# Patient Record
Sex: FEMALE | URBAN - METROPOLITAN AREA
[De-identification: names, ages, dates, MRNs, and addresses within clinical notes are randomized per-mention and may not be internally consistent; named-entity substitution may affect disease eponyms.]

---

## 2024-01-18 ENCOUNTER — OFFICE VISIT (OUTPATIENT)
Dept: OBGYN CLINIC | Age: 29
End: 2024-01-18
Payer: COMMERCIAL

## 2024-01-18 ENCOUNTER — HOSPITAL ENCOUNTER (OUTPATIENT)
Age: 29
Setting detail: SPECIMEN
Discharge: HOME | End: 2024-01-18

## 2024-01-18 VITALS
SYSTOLIC BLOOD PRESSURE: 114 MMHG | HEIGHT: 61 IN | DIASTOLIC BLOOD PRESSURE: 76 MMHG | BODY MASS INDEX: 44.84 KG/M2 | WEIGHT: 237.5 LBS

## 2024-01-18 DIAGNOSIS — Z01.419 WELL WOMAN EXAM WITH ROUTINE GYNECOLOGICAL EXAM: Primary | ICD-10-CM

## 2024-01-18 DIAGNOSIS — Z12.4 CERVICAL CANCER SCREENING: ICD-10-CM

## 2024-01-18 PROCEDURE — 99385 PREV VISIT NEW AGE 18-39: CPT | Performed by: ADVANCED PRACTICE MIDWIFE

## 2024-01-18 RX ORDER — NORGESTIMATE AND ETHINYL ESTRADIOL 7DAYSX3 LO
1 KIT ORAL EVERY MORNING
COMMUNITY

## 2024-01-18 RX ORDER — COLESEVELAM 180 1/1
TABLET ORAL
COMMUNITY

## 2024-01-18 SDOH — ECONOMIC STABILITY: FOOD INSECURITY: WITHIN THE PAST 12 MONTHS, THE FOOD YOU BOUGHT JUST DIDN'T LAST AND YOU DIDN'T HAVE MONEY TO GET MORE.: NEVER TRUE

## 2024-01-18 SDOH — ECONOMIC STABILITY: TRANSPORTATION INSECURITY
IN THE PAST 12 MONTHS, HAS THE LACK OF TRANSPORTATION KEPT YOU FROM MEDICAL APPOINTMENTS OR FROM GETTING MEDICATIONS?: NO

## 2024-01-18 SDOH — ECONOMIC STABILITY: TRANSPORTATION INSECURITY
IN THE PAST 12 MONTHS, HAS LACK OF TRANSPORTATION KEPT YOU FROM MEETINGS, WORK, OR FROM GETTING THINGS NEEDED FOR DAILY LIVING?: NO

## 2024-01-18 SDOH — ECONOMIC STABILITY: FOOD INSECURITY: WITHIN THE PAST 12 MONTHS, YOU WORRIED THAT YOUR FOOD WOULD RUN OUT BEFORE YOU GOT MONEY TO BUY MORE.: NEVER TRUE

## 2024-01-18 ASSESSMENT — SOCIAL DETERMINANTS OF HEALTH (SDOH)
WITHIN THE LAST YEAR, HAVE TO BEEN RAPED OR FORCED TO HAVE ANY KIND OF SEXUAL ACTIVITY BY YOUR PARTNER OR EX-PARTNER?: NO
WITHIN THE LAST YEAR, HAVE YOU BEEN AFRAID OF YOUR PARTNER OR EX-PARTNER?: NO
WITHIN THE LAST YEAR, HAVE YOU BEEN KICKED, HIT, SLAPPED, OR OTHERWISE PHYSICALLY HURT BY YOUR PARTNER OR EX-PARTNER?: NO
HOW HARD IS IT FOR YOU TO PAY FOR THE VERY BASICS LIKE FOOD, HOUSING, MEDICAL CARE, AND HEATING?: NOT HARD AT ALL
WITHIN THE LAST YEAR, HAVE YOU BEEN HUMILIATED OR EMOTIONALLY ABUSED IN OTHER WAYS BY YOUR PARTNER OR EX-PARTNER?: NO

## 2024-01-18 ASSESSMENT — PATIENT HEALTH QUESTIONNAIRE - PHQ9
1. LITTLE INTEREST OR PLEASURE IN DOING THINGS: 0
SUM OF ALL RESPONSES TO PHQ QUESTIONS 1-9: 0
2. FEELING DOWN, DEPRESSED OR HOPELESS: 0
SUM OF ALL RESPONSES TO PHQ QUESTIONS 1-9: 0
SUM OF ALL RESPONSES TO PHQ QUESTIONS 1-9: 0
SUM OF ALL RESPONSES TO PHQ9 QUESTIONS 1 & 2: 0
SUM OF ALL RESPONSES TO PHQ QUESTIONS 1-9: 0

## 2024-01-18 ASSESSMENT — ANXIETY QUESTIONNAIRES
3. WORRYING TOO MUCH ABOUT DIFFERENT THINGS: 0
2. NOT BEING ABLE TO STOP OR CONTROL WORRYING: 0
7. FEELING AFRAID AS IF SOMETHING AWFUL MIGHT HAPPEN: 0
6. BECOMING EASILY ANNOYED OR IRRITABLE: 0
5. BEING SO RESTLESS THAT IT IS HARD TO SIT STILL: 0
1. FEELING NERVOUS, ANXIOUS, OR ON EDGE: 0
4. TROUBLE RELAXING: 0
GAD7 TOTAL SCORE: 0

## 2024-01-18 NOTE — PROGRESS NOTES
Patient is here for her annual exam  Last pap was 8/21/2021 wnl    Patient would like a refill of her    Chaperone for Intimate Exam  Chaperone was offered as part of the rooming process. Patient declined and agrees to continue with exam without a chaperone.  Chaperone: n/a       GAD7-0  PHQ2- 0

## 2024-01-18 NOTE — PROGRESS NOTES
[facility] OBSTETRICS AND GYNECOLOGY  [address]   [address]  [facility] [address]  Dept: [phone]  Annual Exam    Patient Name: Brianda David  Patient : 1995  MRN #: [id number]  [facility] #: [id number]    Date: 2024             Primary Care Physician: Unknown, Provider, DO     HPI : Brianda David is a 28 y.o. female  here for an annual exam.    She reports her menses are every 28 days days, they last 4 days. The flow is light and they are not painful. On ocps   Hx of irregular menses   She is sexually active and uses OCPS for contraception. She is happy with this method. She denies to pain with intercourse.    She has sex with males. She has had 1 sexual partners in the last 12 months. She does not uses condoms.    She denies to bladder issues.     Chaperone for Intimate Exam  Chaperone was offered as part of the rooming process. Patient declined and agrees to continue with exam without a chaperone.    Preventive Health Screening:   Date of last pap:  normal    ________________________________________________________________________  OB History    Para Term  AB Living   0 0 0 0 0 0   SAB IAB Ectopic Molar Multiple Live Births   0 0 0 0 0 0     Past Medical History:   Diagnosis Date    Acne     Irregular menses     Seasonal affective disorder (HCC)                                                                    Past Surgical History:   Procedure Laterality Date    CHOLECYSTECTOMY  2020    EYE SURGERY Right     WISDOM TOOTH EXTRACTION       Family History   Problem Relation Age of Onset    Hypertension Father     Hypertension Mother     Cancer Mother     Cancer Maternal Aunt      Social History     Socioeconomic History    Marital status: Unknown     Spouse name: Not on file    Number of children: Not on file    Years of education: Not on file    Highest education level: Not on file   Occupational

## 2024-02-08 LAB — CYTOLOGY REPORT: NORMAL

## 2024-03-11 SDOH — HEALTH STABILITY: PHYSICAL HEALTH: ON AVERAGE, HOW MANY DAYS PER WEEK DO YOU ENGAGE IN MODERATE TO STRENUOUS EXERCISE (LIKE A BRISK WALK)?: 3 DAYS

## 2024-03-11 SDOH — HEALTH STABILITY: PHYSICAL HEALTH: ON AVERAGE, HOW MANY MINUTES DO YOU ENGAGE IN EXERCISE AT THIS LEVEL?: 30 MIN

## 2024-03-12 ENCOUNTER — OFFICE VISIT (OUTPATIENT)
Dept: PRIMARY CARE CLINIC | Age: 29
End: 2024-03-12
Payer: COMMERCIAL

## 2024-03-12 VITALS
RESPIRATION RATE: 16 BRPM | BODY MASS INDEX: 45.01 KG/M2 | DIASTOLIC BLOOD PRESSURE: 84 MMHG | HEART RATE: 82 BPM | OXYGEN SATURATION: 99 % | SYSTOLIC BLOOD PRESSURE: 124 MMHG | WEIGHT: 238.2 LBS

## 2024-03-12 DIAGNOSIS — E88.819 INSULIN RESISTANCE: ICD-10-CM

## 2024-03-12 DIAGNOSIS — Z76.89 ENCOUNTER TO ESTABLISH CARE: Primary | ICD-10-CM

## 2024-03-12 DIAGNOSIS — E24.0 CUSHING'S DISEASE (HCC): ICD-10-CM

## 2024-03-12 DIAGNOSIS — E78.1 HYPERTRIGLYCERIDEMIA: ICD-10-CM

## 2024-03-12 DIAGNOSIS — R10.13 DYSPEPSIA: ICD-10-CM

## 2024-03-12 PROCEDURE — 99204 OFFICE O/P NEW MOD 45 MIN: CPT | Performed by: NURSE PRACTITIONER

## 2024-03-12 RX ORDER — OMEPRAZOLE 20 MG/1
20 CAPSULE, DELAYED RELEASE ORAL
Qty: 90 CAPSULE | Refills: 1 | Status: SHIPPED | OUTPATIENT
Start: 2024-03-12

## 2024-03-12 RX ORDER — COLESEVELAM 180 1/1
TABLET ORAL
Qty: 180 TABLET | Refills: 3
Start: 2024-03-12

## 2024-03-12 SDOH — ECONOMIC STABILITY: INCOME INSECURITY: HOW HARD IS IT FOR YOU TO PAY FOR THE VERY BASICS LIKE FOOD, HOUSING, MEDICAL CARE, AND HEATING?: NOT HARD AT ALL

## 2024-03-12 SDOH — ECONOMIC STABILITY: FOOD INSECURITY: WITHIN THE PAST 12 MONTHS, THE FOOD YOU BOUGHT JUST DIDN'T LAST AND YOU DIDN'T HAVE MONEY TO GET MORE.: NEVER TRUE

## 2024-03-12 SDOH — ECONOMIC STABILITY: FOOD INSECURITY: WITHIN THE PAST 12 MONTHS, YOU WORRIED THAT YOUR FOOD WOULD RUN OUT BEFORE YOU GOT MONEY TO BUY MORE.: NEVER TRUE

## 2024-03-12 SDOH — ECONOMIC STABILITY: HOUSING INSECURITY
IN THE LAST 12 MONTHS, WAS THERE A TIME WHEN YOU DID NOT HAVE A STEADY PLACE TO SLEEP OR SLEPT IN A SHELTER (INCLUDING NOW)?: NO

## 2024-03-12 ASSESSMENT — PATIENT HEALTH QUESTIONNAIRE - PHQ9
SUM OF ALL RESPONSES TO PHQ QUESTIONS 1-9: 0
SUM OF ALL RESPONSES TO PHQ QUESTIONS 1-9: 0
1. LITTLE INTEREST OR PLEASURE IN DOING THINGS: 0
SUM OF ALL RESPONSES TO PHQ QUESTIONS 1-9: 0
SUM OF ALL RESPONSES TO PHQ QUESTIONS 1-9: 0
SUM OF ALL RESPONSES TO PHQ9 QUESTIONS 1 & 2: 0
2. FEELING DOWN, DEPRESSED OR HOPELESS: 0

## 2024-03-12 NOTE — PROGRESS NOTES
[facility]  [facility] PRIMARY [facility]ARE  [address] [place] [address] [address]  [address]  [facility] [address]  Dept: [phone]  Dept Fax: [phone]    Brianda David is a 29 y.o. female who presents today for her medical conditions/complaintsas noted below.  Brianda David is c/o of Establish Care (Discuss seeing endo for PCOS, did discuss w/ OB)        HPI:     Pt presents to establish care  Bp stable  Weight stable    Pt presents to establish care. Previous providers were at [facility].   Works at [employer]     Issues with dairy as a child. May have IBS. Irregular bowel movements worse after gallbladder. Greasy, fatty or diary she tries to avoid. Welchol does help. She only takes it once a day.   Gallbladder was removed 3.5 years ago. Prior to her gallbladder, she was belching a lot. She has been more consistent the last few months and it has seemed to help. Bad heart burn if she eats to fast and greasy/fatty     Recent new gyn    She has been overweight most her life. Its hard for her to lose weight. She eats healthy. Recently started exercising. Unsure if she has pcos or insulin resistance. C/o buffalo hump on neck. Possible referral to endo         Past Medical History:   Diagnosis Date    Acne     GERD (gastroesophageal reflux disease)     High serum bile acid     Hyperlipidemia     Irregular menses     Seasonal affective disorder (HCC)       Past Surgical History:   Procedure Laterality Date    CHOLECYSTECTOMY  2020    EYE SURGERY Right     WISDOM TOOTH EXTRACTION         Family History   Problem Relation Age of Onset    Hypertension Father     Depression Father     High Blood Pressure Father     Other Father         DVT/Pulmonary Embolism.    Hypertension Mother     Cancer Mother     Breast Cancer Mother         DCIS    High Blood Pressure Mother     Miscarriages / Stillbirths Mother     Cancer Maternal Aunt     Stroke Maternal Grandfather     Breast Cancer Paternal Aunt

## 2024-03-13 ASSESSMENT — ENCOUNTER SYMPTOMS
EYE ITCHING: 0
SINUS PAIN: 0
VOMITING: 0
WHEEZING: 0
NAUSEA: 0
SORE THROAT: 0
SINUS PRESSURE: 0
CHEST TIGHTNESS: 0
SHORTNESS OF BREATH: 0
EYE REDNESS: 0
COUGH: 0
TROUBLE SWALLOWING: 0
EYE DISCHARGE: 0
DIARRHEA: 0
ABDOMINAL PAIN: 0

## 2024-06-05 ENCOUNTER — HOSPITAL ENCOUNTER (OUTPATIENT)
Age: 29
Setting detail: SPECIMEN
Discharge: HOME | End: 2024-06-05

## 2024-06-05 DIAGNOSIS — R53.82 CHRONIC FATIGUE: ICD-10-CM

## 2024-06-05 DIAGNOSIS — Z13.29 ENCOUNTER FOR SCREENING FOR OTHER SUSPECTED ENDOCRINE DISORDER: Primary | ICD-10-CM

## 2024-06-05 LAB
25(OH)D3 SERPL-MCNC: 27.4 NG/ML (ref 30–100)
ALBUMIN SERPL-MCNC: 4.5 G/DL (ref 3.5–5.2)
ALBUMIN/GLOB SERPL: 2 {RATIO} (ref 1–2.5)
ALP SERPL-CCNC: 102 U/L (ref 35–104)
ALT SERPL-CCNC: 26 U/L (ref 10–35)
ANION GAP SERPL CALCULATED.3IONS-SCNC: 10 MMOL/L (ref 9–16)
AST SERPL-CCNC: 28 U/L (ref 10–35)
BILIRUB SERPL-MCNC: 0.3 MG/DL (ref 0–1.2)
BUN SERPL-MCNC: 13 MG/DL (ref 6–20)
CALCIUM SERPL-MCNC: 9.6 MG/DL (ref 8.6–10.4)
CHLORIDE SERPL-SCNC: 104 MMOL/L (ref 98–107)
CO2 SERPL-SCNC: 26 MMOL/L (ref 20–31)
CREAT SERPL-MCNC: 0.6 MG/DL (ref 0.5–0.9)
EST. AVERAGE GLUCOSE BLD GHB EST-MCNC: 111 MG/DL
ESTRADIOL LEVEL: 16 PG/ML
FSH SERPL-ACNC: 4.6 MIU/ML
GFR, ESTIMATED: >90 ML/MIN/1.73M2
GLUCOSE P FAST SERPL-MCNC: 92 MG/DL (ref 74–99)
HBA1C MFR BLD: 5.5 % (ref 4–6)
INSULIN COMMENT: NORMAL
INSULIN REFERENCE RANGE:: NORMAL
INSULIN: 19.5 MU/L
IRON SATN MFR SERPL: 26 % (ref 20–55)
IRON SERPL-MCNC: 112 UG/DL (ref 37–145)
LH SERPL-ACNC: 7.7 MIU/ML (ref 1.7–8.6)
POTASSIUM SERPL-SCNC: 4.8 MMOL/L (ref 3.7–5.3)
PROLACTIN SERPL-MCNC: 12.1 NG/ML (ref 4.79–23.3)
PROT SERPL-MCNC: 7.4 G/DL (ref 6.6–8.7)
SODIUM SERPL-SCNC: 140 MMOL/L (ref 136–145)
T3FREE SERPL-MCNC: 3 PG/ML (ref 2–4.4)
T4 FREE SERPL-MCNC: 0.9 NG/DL (ref 0.92–1.68)
TIBC SERPL-MCNC: 432 UG/DL (ref 250–450)
TSH SERPL DL<=0.05 MIU/L-ACNC: 3.43 UIU/ML (ref 0.27–4.2)
UNSATURATED IRON BINDING CAPACITY: 320 UG/DL (ref 112–347)

## 2024-06-05 RX ORDER — SODIUM CHLORIDE 9 MG/ML
5-250 INJECTION, SOLUTION INTRAVENOUS PRN
OUTPATIENT
Start: 2024-06-05

## 2024-06-06 ENCOUNTER — TELEPHONE (OUTPATIENT)
Dept: INFUSION THERAPY | Facility: MEDICAL CENTER | Age: 29
End: 2024-06-06

## 2024-06-06 ENCOUNTER — TELEPHONE (OUTPATIENT)
Dept: ONCOLOGY | Age: 29
End: 2024-06-06

## 2024-06-06 ENCOUNTER — PATIENT MESSAGE (OUTPATIENT)
Dept: PRIMARY CARE CLINIC | Age: 29
End: 2024-06-06

## 2024-06-06 DIAGNOSIS — K58.2 IRRITABLE BOWEL SYNDROME WITH BOTH CONSTIPATION AND DIARRHEA: Primary | ICD-10-CM

## 2024-06-06 NOTE — TELEPHONE ENCOUNTER
6/5/24 Dr. Gopi Adler MD  Serum cortisol 30 min after 1mcg Cotrosyn IV  Order noted and to  for processing

## 2024-06-06 NOTE — TELEPHONE ENCOUNTER
I TRIED TO CALL [facility] TO SCHEDULE HER CORTROSYN TEST AND HAD TO LEAVE A MESSAGE TO CALL THE OFFICE TO SCHEDULE.

## 2024-06-07 ENCOUNTER — HOSPITAL ENCOUNTER (OUTPATIENT)
Dept: ULTRASOUND IMAGING | Age: 29
End: 2024-06-07
Attending: INTERNAL MEDICINE
Payer: COMMERCIAL

## 2024-06-07 ENCOUNTER — HOSPITAL ENCOUNTER (OUTPATIENT)
Age: 29
Discharge: HOME | End: 2024-06-07
Payer: COMMERCIAL

## 2024-06-07 DIAGNOSIS — R63.5 ABNORMAL WEIGHT GAIN: ICD-10-CM

## 2024-06-07 DIAGNOSIS — Z13.29 ENCOUNTER FOR SCREENING FOR OTHER SUSPECTED ENDOCRINE DISORDER: ICD-10-CM

## 2024-06-07 LAB — THYROPEROXIDASE AB SERPL IA-ACNC: <4 IU/ML (ref 0–25)

## 2024-06-07 PROCEDURE — 76536 US EXAM OF HEAD AND NECK: CPT

## 2024-06-16 ENCOUNTER — HOSPITAL ENCOUNTER (OUTPATIENT)
Age: 29
Discharge: HOME | End: 2024-06-16
Payer: COMMERCIAL

## 2024-06-16 DIAGNOSIS — K58.2 IRRITABLE BOWEL SYNDROME WITH BOTH CONSTIPATION AND DIARRHEA: ICD-10-CM

## 2024-06-16 PROCEDURE — 83516 IMMUNOASSAY NONANTIBODY: CPT

## 2024-06-16 PROCEDURE — 82785 ASSAY OF IGE: CPT

## 2024-06-16 PROCEDURE — 86003 ALLG SPEC IGE CRUDE XTRC EA: CPT

## 2024-06-16 PROCEDURE — 82784 ASSAY IGA/IGD/IGG/IGM EACH: CPT

## 2024-06-16 PROCEDURE — 36415 COLL VENOUS BLD VENIPUNCTURE: CPT

## 2024-06-17 ENCOUNTER — HOSPITAL ENCOUNTER (OUTPATIENT)
Facility: MEDICAL CENTER | Age: 29
End: 2024-06-17
Payer: COMMERCIAL

## 2024-06-17 LAB
GLIADIN IGA SER IA-ACNC: <0.1 U/ML
GLIADIN IGG SER IA-ACNC: <0.4 U/ML
IGA SERPL-MCNC: 144 MG/DL (ref 70–400)
TTG IGA SER IA-ACNC: <0.1 U/ML

## 2024-06-20 ENCOUNTER — HOSPITAL ENCOUNTER (OUTPATIENT)
Dept: INFUSION THERAPY | Facility: MEDICAL CENTER | Age: 29
Discharge: HOME | End: 2024-06-20
Payer: COMMERCIAL

## 2024-06-20 VITALS
SYSTOLIC BLOOD PRESSURE: 126 MMHG | RESPIRATION RATE: 16 BRPM | HEART RATE: 68 BPM | DIASTOLIC BLOOD PRESSURE: 83 MMHG | TEMPERATURE: 98.6 F

## 2024-06-20 DIAGNOSIS — Z13.29 ENCOUNTER FOR SCREENING FOR OTHER SUSPECTED ENDOCRINE DISORDER: ICD-10-CM

## 2024-06-20 DIAGNOSIS — R53.82 CHRONIC FATIGUE: Primary | ICD-10-CM

## 2024-06-20 LAB
BARLEY IGE QN: <0.1 KU/L (ref 0–0.34)
BEEF IGE QN: <0.1 KU/L (ref 0–0.34)
CABBAGE IGE QN: <0.1 KU/L (ref 0–0.34)
CARROT IGE QN: <0.1 KU/L (ref 0–0.34)
CHICKEN MEAT IGE QN: <0.1 KU/L (ref 0–0.34)
CODFISH IGE QN: <0.1 KU/L (ref 0–0.34)
CORN IGE QN: <0.1 KU/L (ref 0–0.34)
CORTIS SERPL-MCNC: 31.7 UG/DL (ref 2.5–19.5)
COW MILK IGE QN: <0.1 KU/L (ref 0–0.34)
CRAB IGE QN: <0.1 KU/L (ref 0–0.34)
EGG WHITE IGE QN: <0.1 KU/L (ref 0–0.34)
GRAPE IGE QN: <0.1 KU/L (ref 0–0.34)
IGE SERPL-ACNC: 31 IU/ML (ref 0–100)
LETTUCE IGE QN: <0.1 KU/L (ref 0–0.34)
OAT IGE QN: <0.1 KU/L (ref 0–0.34)
ORANGE TREE IGE QN: <0.1 KU/L (ref 0–0.34)
PAPRIKA IGE QN: <0.1 KU/L (ref 0–0.34)
PEANUT IGE QN: <0.1 KU/L (ref 0–0.34)
PORK IGE QN: <0.1 KU/L (ref 0–0.34)
POTATO IGE QN: <0.1 KU/L (ref 0–0.34)
RICE IGE QN: <0.1 KU/L (ref 0–0.34)
RYE IGE QN: <0.1 KU/L (ref 0–0.34)
SHRIMP IGE QN: <0.1 KU/L (ref 0–0.34)
SOYBEAN IGE QN: <0.1 KU/L (ref 0–0.34)
TOMATO IGE QN: <0.1 KU/L (ref 0–0.34)
TUNA IGE QN: <0.1 KU/L (ref 0–0.34)
WHEAT IGE QN: <0.1 KU/L (ref 0–0.34)
WHITE BEAN IGE QN: <0.1 KU/L (ref 0–0.34)

## 2024-06-20 PROCEDURE — 2580000003 HC RX 258: Performed by: INTERNAL MEDICINE

## 2024-06-20 PROCEDURE — 96374 THER/PROPH/DIAG INJ IV PUSH: CPT

## 2024-06-20 PROCEDURE — 82533 TOTAL CORTISOL: CPT

## 2024-06-20 PROCEDURE — 96375 TX/PRO/DX INJ NEW DRUG ADDON: CPT

## 2024-06-20 PROCEDURE — 6360000002 HC RX W HCPCS: Performed by: INTERNAL MEDICINE

## 2024-06-20 RX ORDER — SODIUM CHLORIDE 9 MG/ML
5-250 INJECTION, SOLUTION INTRAVENOUS PRN
OUTPATIENT
Start: 2024-06-20

## 2024-06-20 RX ORDER — SODIUM CHLORIDE 9 MG/ML
5-250 INJECTION, SOLUTION INTRAVENOUS PRN
Status: DISCONTINUED | OUTPATIENT
Start: 2024-06-20 | End: 2024-06-21 | Stop reason: HOSPADM

## 2024-06-20 RX ADMIN — SODIUM CHLORIDE 100 ML/HR: 9 INJECTION, SOLUTION INTRAVENOUS at 08:48

## 2024-06-20 RX ADMIN — COSYNTROPIN 1 MCG: 0.25 INJECTION, POWDER, LYOPHILIZED, FOR SOLUTION INTRAMUSCULAR; INTRAVENOUS at 08:50

## 2024-06-20 NOTE — PROGRESS NOTES
Pt here for Cortrosyn injection and lab.  Arrives ambulatory   Denies complaints/concerns.   Multiple attempts made for peripheral IV.  Cortrosyn given @ 0875  Phleb nancie lab @ 5775  Peripheral IV removed  Patient discharged in stable condition

## 2024-07-01 ENCOUNTER — HOSPITAL ENCOUNTER (OUTPATIENT)
Age: 29
Setting detail: SPECIMEN
Discharge: HOME | End: 2024-07-01

## 2024-07-05 LAB — CORTISOL SALIVARY: 0.04 UG/DL

## 2024-07-25 ENCOUNTER — CLINICAL DOCUMENTATION (OUTPATIENT)
Facility: HOSPITAL | Age: 29
End: 2024-07-25

## 2024-08-08 ENCOUNTER — HOSPITAL ENCOUNTER (OUTPATIENT)
Age: 29
Setting detail: SPECIMEN
Discharge: HOME | End: 2024-08-08

## 2024-08-08 LAB
25(OH)D3 SERPL-MCNC: 32.9 NG/ML (ref 30–100)
T3FREE SERPL-MCNC: 3.1 PG/ML (ref 2–4.4)
T4 FREE SERPL-MCNC: 1 NG/DL (ref 0.92–1.68)
TSH SERPL DL<=0.05 MIU/L-ACNC: 4.23 UIU/ML (ref 0.27–4.2)

## 2024-08-11 ENCOUNTER — PATIENT MESSAGE (OUTPATIENT)
Dept: OBGYN CLINIC | Age: 29
End: 2024-08-11

## 2024-08-12 RX ORDER — NORGESTIMATE AND ETHINYL ESTRADIOL 7DAYSX3 LO
1 KIT ORAL EVERY MORNING
Qty: 1 PACKET | Refills: 4 | Status: SHIPPED | OUTPATIENT
Start: 2024-08-12

## 2024-08-12 RX ORDER — NORGESTIMATE AND ETHINYL ESTRADIOL 7DAYSX3 LO
1 KIT ORAL EVERY MORNING
Qty: 1 PACKET | Refills: 4 | Status: SHIPPED | OUTPATIENT
Start: 2024-08-12 | End: 2024-08-12 | Stop reason: SDUPTHER

## 2024-08-13 RX ORDER — NORGESTIMATE AND ETHINYL ESTRADIOL 7DAYSX3 LO
1 KIT ORAL EVERY MORNING
Qty: 1 PACKET | Refills: 4 | OUTPATIENT
Start: 2024-08-13

## 2024-08-20 DIAGNOSIS — N94.6 DYSMENORRHEA: Primary | ICD-10-CM

## 2024-08-20 RX ORDER — NORGESTIMATE AND ETHINYL ESTRADIOL 7DAYSX3 LO
1 KIT ORAL EVERY MORNING
Qty: 1 PACKET | Refills: 4 | Status: SHIPPED | OUTPATIENT
Start: 2024-08-20

## 2024-09-12 ENCOUNTER — HOSPITAL ENCOUNTER (OUTPATIENT)
Age: 29
Setting detail: SPECIMEN
Discharge: HOME | End: 2024-09-12

## 2024-09-12 LAB
T4 FREE SERPL-MCNC: 1.1 NG/DL (ref 0.92–1.68)
TSH SERPL DL<=0.05 MIU/L-ACNC: 3.07 UIU/ML (ref 0.27–4.2)

## 2024-09-13 ENCOUNTER — PATIENT MESSAGE (OUTPATIENT)
Dept: PRIMARY CARE CLINIC | Age: 29
End: 2024-09-13

## 2024-09-13 DIAGNOSIS — Z13.220 ENCOUNTER FOR LIPID SCREENING FOR CARDIOVASCULAR DISEASE: Primary | ICD-10-CM

## 2024-09-13 DIAGNOSIS — Z13.6 ENCOUNTER FOR LIPID SCREENING FOR CARDIOVASCULAR DISEASE: Primary | ICD-10-CM

## 2024-09-20 ENCOUNTER — HOSPITAL ENCOUNTER (OUTPATIENT)
Age: 29
Discharge: HOME | End: 2024-09-20
Payer: COMMERCIAL

## 2024-09-20 DIAGNOSIS — Z13.220 ENCOUNTER FOR LIPID SCREENING FOR CARDIOVASCULAR DISEASE: ICD-10-CM

## 2024-09-20 DIAGNOSIS — Z13.6 ENCOUNTER FOR LIPID SCREENING FOR CARDIOVASCULAR DISEASE: ICD-10-CM

## 2024-09-20 LAB
CHOLEST SERPL-MCNC: 184 MG/DL (ref 0–199)
CHOLESTEROL/HDL RATIO: 5
GLUCOSE SERPL-MCNC: 94 MG/DL (ref 74–99)
HDLC SERPL-MCNC: 40 MG/DL
LDLC SERPL CALC-MCNC: 73 MG/DL (ref 0–100)
PATIENT FASTING?: YES
TRIGL SERPL-MCNC: 356 MG/DL
VLDLC SERPL CALC-MCNC: 71 MG/DL

## 2024-09-20 PROCEDURE — 80061 LIPID PANEL: CPT

## 2024-09-20 PROCEDURE — 36415 COLL VENOUS BLD VENIPUNCTURE: CPT

## 2024-09-20 PROCEDURE — 82947 ASSAY GLUCOSE BLOOD QUANT: CPT

## 2024-09-25 ENCOUNTER — LAB (OUTPATIENT)
Dept: FAMILY MEDICINE CLINIC | Age: 29
End: 2024-09-25

## 2024-09-25 VITALS — BODY MASS INDEX: 45.27 KG/M2 | WEIGHT: 239.8 LBS | HEIGHT: 61 IN

## 2024-11-19 ENCOUNTER — OFFICE VISIT (OUTPATIENT)
Dept: FAMILY MEDICINE CLINIC | Age: 29
End: 2024-11-19
Payer: COMMERCIAL

## 2024-11-19 VITALS
WEIGHT: 239 LBS | DIASTOLIC BLOOD PRESSURE: 72 MMHG | TEMPERATURE: 97.5 F | SYSTOLIC BLOOD PRESSURE: 116 MMHG | HEART RATE: 72 BPM | RESPIRATION RATE: 16 BRPM | BODY MASS INDEX: 45.16 KG/M2 | OXYGEN SATURATION: 98 %

## 2024-11-19 DIAGNOSIS — H69.93 DYSFUNCTION OF BOTH EUSTACHIAN TUBES: ICD-10-CM

## 2024-11-19 DIAGNOSIS — H60.501 ACUTE OTITIS EXTERNA OF RIGHT EAR, UNSPECIFIED TYPE: ICD-10-CM

## 2024-11-19 DIAGNOSIS — H65.191 ACUTE MEE (MIDDLE EAR EFFUSION), RIGHT: Primary | ICD-10-CM

## 2024-11-19 PROCEDURE — 99213 OFFICE O/P EST LOW 20 MIN: CPT | Performed by: NURSE PRACTITIONER

## 2024-11-19 RX ORDER — ERGOCALCIFEROL 1.25 MG/1
CAPSULE, LIQUID FILLED ORAL
COMMUNITY
Start: 2024-08-12

## 2024-11-19 RX ORDER — LEVOTHYROXINE SODIUM 25 UG/1
TABLET ORAL
COMMUNITY
Start: 2024-08-14

## 2024-11-19 RX ORDER — FLUTICASONE PROPIONATE 50 MCG
2 SPRAY, SUSPENSION (ML) NASAL DAILY
Qty: 16 G | Refills: 0 | Status: SHIPPED | OUTPATIENT
Start: 2024-11-19

## 2024-11-19 RX ORDER — CIPROFLOXACIN AND DEXAMETHASONE 3; 1 MG/ML; MG/ML
4 SUSPENSION/ DROPS AURICULAR (OTIC) 2 TIMES DAILY
Qty: 7.5 ML | Refills: 0 | Status: SHIPPED | OUTPATIENT
Start: 2024-11-19 | End: 2024-11-26

## 2024-11-19 ASSESSMENT — ENCOUNTER SYMPTOMS
NAUSEA: 0
EYE DISCHARGE: 0
FACIAL SWELLING: 0
VOMITING: 0
COUGH: 0
EYE REDNESS: 0

## 2024-11-19 NOTE — PROGRESS NOTES
[facility] W[facility]  [address]  [address]  [facility] [address]  Dept: [phone]  Dept Fax: [phone]    Brianda David is a 29 y.o. female who presents today for her medical conditions/complaints of   Chief Complaint   Patient presents with    Jaw Pain     R upper side x 2 days.     Ear Pain     R ear x 1 day          HPI:     /72   Pulse 72   Temp 97.5 °F (36.4 °C)   Resp 16   Wt 108.4 kg (239 lb)   SpO2 98%   BMI 45.16 kg/m²       HPI  Pt presented to the clinic today with c/o right ear pain. This is a new problem. The current episode started 2 days ago. Associated symptoms include: congestion, right ear canal swelling, muffled hearing .  Pertinent negatives include: No fever, chills, ear drainage, loss of hearing.     Past Medical History:   Diagnosis Date    Acne     GERD (gastroesophageal reflux disease)     High serum bile acid     Hyperlipidemia     Irregular menses     Seasonal affective disorder (HCC)         Past Surgical History:   Procedure Laterality Date    CHOLECYSTECTOMY  2020    EYE SURGERY Right     WISDOM TOOTH EXTRACTION         Family History   Problem Relation Age of Onset    Hypertension Father     Depression Father     High Blood Pressure Father     Other Father         DVT/Pulmonary Embolism.    Hypertension Mother     Cancer Mother     Breast Cancer Mother         DCIS    High Blood Pressure Mother     Miscarriages / Stillbirths Mother     Cancer Maternal Aunt     Stroke Maternal Grandfather     Breast Cancer Paternal Aunt         Negative genetic testing.    Breast Cancer Paternal Aunt     Depression Brother        Social History     Tobacco Use    Smoking status: Never    Smokeless tobacco: Never   Substance Use Topics    Alcohol use: Yes     Comment: Socially, rarely; 1-2 drinks per month        Prior to Visit Medications    Medication Sig Taking? Authorizing Provider   levothyroxine

## 2024-12-09 DIAGNOSIS — N94.6 DYSMENORRHEA: ICD-10-CM

## 2024-12-09 RX ORDER — NORGESTIMATE AND ETHINYL ESTRADIOL 7DAYSX3 LO
1 KIT ORAL EVERY MORNING
Qty: 28 TABLET | Refills: 1 | Status: SHIPPED | OUTPATIENT
Start: 2024-12-09

## 2024-12-09 NOTE — TELEPHONE ENCOUNTER
Brianda David is requesting a refill on [facility] .     Last Annual visit:  1/18/24 with Meenu   Next Office visit:  1/29/25 annual with Meenu     Currently Pregnant n/a      Last prescribing provider:  Meenu Hernandez CNM      Pharmacy faxed over a refill request.   Writer called and scheduled annual with patient.

## 2025-01-05 DIAGNOSIS — N94.6 DYSMENORRHEA: ICD-10-CM

## 2025-01-07 RX ORDER — NORGESTIMATE AND ETHINYL ESTRADIOL 7DAYSX3 LO
1 KIT ORAL EVERY MORNING
Qty: 28 TABLET | Refills: 1 | OUTPATIENT
Start: 2025-01-07

## 2025-01-13 ENCOUNTER — HOSPITAL ENCOUNTER (OUTPATIENT)
Age: 30
Discharge: HOME | End: 2025-01-13
Payer: COMMERCIAL

## 2025-01-13 LAB
25(OH)D3 SERPL-MCNC: 38.1 NG/ML (ref 30–100)
T3FREE SERPL-MCNC: 3.23 PG/ML (ref 2–4.4)
T4 FREE SERPL-MCNC: 1.2 NG/DL (ref 0.92–1.68)
TSH SERPL DL<=0.05 MIU/L-ACNC: 1.79 UIU/ML (ref 0.27–4.2)

## 2025-01-13 PROCEDURE — 84481 FREE ASSAY (FT-3): CPT

## 2025-01-13 PROCEDURE — 36415 COLL VENOUS BLD VENIPUNCTURE: CPT

## 2025-01-13 PROCEDURE — 84443 ASSAY THYROID STIM HORMONE: CPT

## 2025-01-13 PROCEDURE — 84439 ASSAY OF FREE THYROXINE: CPT

## 2025-01-13 PROCEDURE — 82306 VITAMIN D 25 HYDROXY: CPT

## 2025-01-29 ENCOUNTER — OFFICE VISIT (OUTPATIENT)
Dept: OBGYN CLINIC | Age: 30
End: 2025-01-29

## 2025-01-29 VITALS
DIASTOLIC BLOOD PRESSURE: 68 MMHG | WEIGHT: 233.5 LBS | HEART RATE: 85 BPM | BODY MASS INDEX: 44.08 KG/M2 | OXYGEN SATURATION: 98 % | SYSTOLIC BLOOD PRESSURE: 110 MMHG | HEIGHT: 61 IN

## 2025-01-29 DIAGNOSIS — Z01.419 WELL WOMAN EXAM WITH ROUTINE GYNECOLOGICAL EXAM: Primary | ICD-10-CM

## 2025-01-29 DIAGNOSIS — E28.2 PCOS (POLYCYSTIC OVARIAN SYNDROME): ICD-10-CM

## 2025-01-29 DIAGNOSIS — N94.10 DYSPAREUNIA IN FEMALE: ICD-10-CM

## 2025-01-29 PROCEDURE — 0502F SUBSEQUENT PRENATAL CARE: CPT | Performed by: ADVANCED PRACTICE MIDWIFE

## 2025-01-29 SDOH — ECONOMIC STABILITY: FOOD INSECURITY: WITHIN THE PAST 12 MONTHS, YOU WORRIED THAT YOUR FOOD WOULD RUN OUT BEFORE YOU GOT MONEY TO BUY MORE.: NEVER TRUE

## 2025-01-29 SDOH — ECONOMIC STABILITY: FOOD INSECURITY: WITHIN THE PAST 12 MONTHS, THE FOOD YOU BOUGHT JUST DIDN'T LAST AND YOU DIDN'T HAVE MONEY TO GET MORE.: NEVER TRUE

## 2025-01-29 ASSESSMENT — ANXIETY QUESTIONNAIRES
1. FEELING NERVOUS, ANXIOUS, OR ON EDGE: SEVERAL DAYS
3. WORRYING TOO MUCH ABOUT DIFFERENT THINGS: NOT AT ALL
GAD7 TOTAL SCORE: 2
IF YOU CHECKED OFF ANY PROBLEMS ON THIS QUESTIONNAIRE, HOW DIFFICULT HAVE THESE PROBLEMS MADE IT FOR YOU TO DO YOUR WORK, TAKE CARE OF THINGS AT HOME, OR GET ALONG WITH OTHER PEOPLE: NOT DIFFICULT AT ALL
7. FEELING AFRAID AS IF SOMETHING AWFUL MIGHT HAPPEN: NOT AT ALL
4. TROUBLE RELAXING: NOT AT ALL
5. BEING SO RESTLESS THAT IT IS HARD TO SIT STILL: NOT AT ALL
2. NOT BEING ABLE TO STOP OR CONTROL WORRYING: NOT AT ALL
6. BECOMING EASILY ANNOYED OR IRRITABLE: SEVERAL DAYS

## 2025-01-29 ASSESSMENT — PATIENT HEALTH QUESTIONNAIRE - PHQ9
SUM OF ALL RESPONSES TO PHQ QUESTIONS 1-9: 0
SUM OF ALL RESPONSES TO PHQ QUESTIONS 1-9: 0
SUM OF ALL RESPONSES TO PHQ9 QUESTIONS 1 & 2: 0
SUM OF ALL RESPONSES TO PHQ QUESTIONS 1-9: 0
1. LITTLE INTEREST OR PLEASURE IN DOING THINGS: NOT AT ALL
SUM OF ALL RESPONSES TO PHQ QUESTIONS 1-9: 0
2. FEELING DOWN, DEPRESSED OR HOPELESS: NOT AT ALL

## 2025-05-02 ENCOUNTER — OFFICE VISIT (OUTPATIENT)
Dept: FAMILY MEDICINE CLINIC | Age: 30
End: 2025-05-02
Payer: COMMERCIAL

## 2025-05-02 ENCOUNTER — HOSPITAL ENCOUNTER (OUTPATIENT)
Age: 30
Setting detail: SPECIMEN
Discharge: HOME | End: 2025-05-02

## 2025-05-02 VITALS
HEART RATE: 62 BPM | DIASTOLIC BLOOD PRESSURE: 76 MMHG | RESPIRATION RATE: 16 BRPM | BODY MASS INDEX: 44.59 KG/M2 | OXYGEN SATURATION: 100 % | WEIGHT: 236 LBS | SYSTOLIC BLOOD PRESSURE: 120 MMHG | TEMPERATURE: 98.5 F

## 2025-05-02 DIAGNOSIS — L03.032 PARONYCHIA OF GREAT TOE OF LEFT FOOT: Primary | ICD-10-CM

## 2025-05-02 DIAGNOSIS — L03.032 PARONYCHIA OF GREAT TOE OF LEFT FOOT: ICD-10-CM

## 2025-05-02 PROCEDURE — 99213 OFFICE O/P EST LOW 20 MIN: CPT | Performed by: NURSE PRACTITIONER

## 2025-05-02 RX ORDER — CEPHALEXIN 500 MG/1
500 CAPSULE ORAL 3 TIMES DAILY
Qty: 30 CAPSULE | Refills: 0 | Status: SHIPPED | OUTPATIENT
Start: 2025-05-02 | End: 2025-05-12

## 2025-05-02 NOTE — PROGRESS NOTES
[facility] W[facility]  [address]  [address]  [facility] [address]  Dept: [phone]  Dept Fax: [phone]    Brianda David is a 30 y.o. female who presents today for her medical conditions/complaints of   Chief Complaint   Patient presents with    Nail Problem     X 1 week. Great toe - left foot.           HPI:     /76   Pulse 62   Temp 98.5 °F (36.9 °C)   Resp 16   Wt 107 kg (236 lb)   SpO2 100%   BMI 44.59 kg/m²       HPI  Pt presented to the walk in clinic today with c/o infection to her left great toe.  This is a new problem. The current episode started 7 days ago.  She clipped the cuticle/nail cutting the skin around the nail  Associated symptoms include: pain, redness, swelling and drainage from around the nail .  Pertinent negatives include: No fever, nausea, vomiting .  Pt has tried triple antibiotic with no improvement.       Past Medical History:   Diagnosis Date    Acne     Dumping syndrome     GERD (gastroesophageal reflux disease)     High serum bile acid     Hyperlipidemia     Irregular menses     Seasonal affective disorder         Past Surgical History:   Procedure Laterality Date    CHOLECYSTECTOMY  2020    EYE SURGERY Right     WISDOM TOOTH EXTRACTION         Family History   Problem Relation Age of Onset    Hypertension Father     Depression Father     High Blood Pressure Father     Other Father         DVT/Pulmonary Embolism.    Hypertension Mother     Cancer Mother     Breast Cancer Mother         DCIS    High Blood Pressure Mother     Miscarriages / Stillbirths Mother     Cancer Maternal Aunt     Stroke Maternal Grandfather     Breast Cancer Paternal Aunt         Negative genetic testing.    Breast Cancer Paternal Aunt     Depression Brother        Social History     Tobacco Use    Smoking status: Never    Smokeless tobacco: Never   Substance Use Topics    Alcohol use: Yes     Comment: Socially,

## 2025-05-04 LAB
MICROORGANISM SPEC CULT: ABNORMAL
MICROORGANISM/AGENT SPEC: ABNORMAL
MICROORGANISM/AGENT SPEC: ABNORMAL
SPECIMEN DESCRIPTION: ABNORMAL

## 2025-05-05 ENCOUNTER — RESULTS FOLLOW-UP (OUTPATIENT)
Dept: FAMILY MEDICINE CLINIC | Age: 30
End: 2025-05-05

## 2025-05-05 RX ORDER — SULFAMETHOXAZOLE AND TRIMETHOPRIM 800; 160 MG/1; MG/1
1 TABLET ORAL 2 TIMES DAILY
Qty: 14 TABLET | Refills: 0 | Status: SHIPPED | OUTPATIENT
Start: 2025-05-05 | End: 2025-05-12

## 2025-05-19 RX ORDER — COLESEVELAM 180 1/1
TABLET ORAL
Qty: 180 TABLET | Refills: 3 | OUTPATIENT
Start: 2025-05-19

## 2025-06-06 RX ORDER — COLESEVELAM 180 1/1
TABLET ORAL
Qty: 180 TABLET | Refills: 3 | Status: SHIPPED | OUTPATIENT
Start: 2025-06-06

## 2025-06-06 NOTE — TELEPHONE ENCOUNTER
Last Visit Date: 3/12/2024   Next Visit Date: 7/23/2025     Pt has requested medication twice, was denied d/t need for appt but no one f/u with appointment about this. Writer did schedule pt for next available afternoon physical.    Please call pt when medication has been sent.

## 2025-07-17 LAB
CHOLEST SERPL-MCNC: 166 MG/DL (ref 0–199)
CHOLESTEROL/HDL RATIO: 4.3
GLUCOSE SERPL-MCNC: 92 MG/DL (ref 74–99)
HDLC SERPL-MCNC: 39 MG/DL
LDLC SERPL CALC-MCNC: 82 MG/DL (ref 0–100)
PATIENT FASTING?: ABNORMAL
TRIGL SERPL-MCNC: 226 MG/DL
VLDLC SERPL CALC-MCNC: 45 MG/DL (ref 1–30)

## 2025-07-23 ENCOUNTER — OFFICE VISIT (OUTPATIENT)
Dept: PRIMARY CARE CLINIC | Age: 30
End: 2025-07-23
Payer: COMMERCIAL

## 2025-07-23 VITALS
DIASTOLIC BLOOD PRESSURE: 76 MMHG | HEIGHT: 62 IN | WEIGHT: 237.4 LBS | BODY MASS INDEX: 43.69 KG/M2 | SYSTOLIC BLOOD PRESSURE: 110 MMHG | HEART RATE: 78 BPM | OXYGEN SATURATION: 97 %

## 2025-07-23 DIAGNOSIS — E78.2 MIXED HYPERLIPIDEMIA: ICD-10-CM

## 2025-07-23 DIAGNOSIS — Z00.00 ENCOUNTER FOR WELL ADULT EXAM WITHOUT ABNORMAL FINDINGS: Primary | ICD-10-CM

## 2025-07-23 DIAGNOSIS — E03.9 HYPOTHYROIDISM, UNSPECIFIED TYPE: ICD-10-CM

## 2025-07-23 DIAGNOSIS — E28.2 PCOS (POLYCYSTIC OVARIAN SYNDROME): ICD-10-CM

## 2025-07-23 PROBLEM — E24.0 CUSHING'S DISEASE (HCC): Status: RESOLVED | Noted: 2025-07-23 | Resolved: 2025-07-23

## 2025-07-23 PROBLEM — E24.0 CUSHING'S DISEASE (HCC): Status: ACTIVE | Noted: 2025-07-23

## 2025-07-23 PROCEDURE — 99395 PREV VISIT EST AGE 18-39: CPT | Performed by: NURSE PRACTITIONER

## 2025-07-23 SDOH — ECONOMIC STABILITY: FOOD INSECURITY: WITHIN THE PAST 12 MONTHS, YOU WORRIED THAT YOUR FOOD WOULD RUN OUT BEFORE YOU GOT MONEY TO BUY MORE.: NEVER TRUE

## 2025-07-23 SDOH — ECONOMIC STABILITY: FOOD INSECURITY: WITHIN THE PAST 12 MONTHS, THE FOOD YOU BOUGHT JUST DIDN'T LAST AND YOU DIDN'T HAVE MONEY TO GET MORE.: NEVER TRUE

## 2025-07-23 ASSESSMENT — ENCOUNTER SYMPTOMS
EYE DISCHARGE: 0
ABDOMINAL PAIN: 0
SINUS PRESSURE: 0
EYE REDNESS: 0
COUGH: 0
SORE THROAT: 0
TROUBLE SWALLOWING: 0
EYE ITCHING: 0
SHORTNESS OF BREATH: 0
DIARRHEA: 0
CHEST TIGHTNESS: 0
VOMITING: 0
NAUSEA: 0
SINUS PAIN: 0
WHEEZING: 0

## 2025-07-23 ASSESSMENT — PATIENT HEALTH QUESTIONNAIRE - PHQ9
SUM OF ALL RESPONSES TO PHQ QUESTIONS 1-9: 0
1. LITTLE INTEREST OR PLEASURE IN DOING THINGS: NOT AT ALL
SUM OF ALL RESPONSES TO PHQ QUESTIONS 1-9: 0
2. FEELING DOWN, DEPRESSED OR HOPELESS: NOT AT ALL

## 2025-07-23 NOTE — PROGRESS NOTES
Well Adult Note  Name: Brianda David Today’s Date: 2025   MRN: [id number] Sex: Female   Age: 30 y.o. Ethnicity: Non- / Non    : 1995 Race: White (non-)      Brianda David is here for a well adult exam.          Assessment & Plan  1. Abnormal cortisol  - The patient had a high total cortisol level but a normal salivary cortisol test.  - No significant findings from the endocrinologist visit; no treatment initiated.  - Advised to discuss these results with her endocrinologist during the next visit.  - Consider over-the-counter cortisol-lowering supplements if recommended by the endocrinologist.    2. Hypothyroidism.  - Currently taking levothyroxine 25 mcg daily and reports feeling less fatigued since starting the medication.  - Thyroid peroxidase level was negative, but she remains symptomatic with abnormal TSH levels.  - No additional lab work needed today.  - Continue current medication regimen and monitor symptoms.  -follows with endo    3. Polycystic Ovary Syndrome (PCOS).  - Advised by her gynecologist to take inositol and berberine, which she has started.  - Supplements are expected to help lower triglycerides and manage PCOS symptoms.  - Reports improved menstrual regularity since stopping birth control in January.  - Continue current regimen and monitor for symptom improvement.    4. Hyperlipidemia.  - Triglycerides have decreased by 125 points but remain elevated; HDL is still slightly low.  - Stopped birth control in January, which may have contributed to the improvement.  - Uncontrolled thyroid conditions can also affect lipid levels.  - Continue current management plan and monitor lipid levels.    5. Valvular Regurgitation.  - Trace mitral and trace aortic regurgitation noted.  - Echocardiogram will be scheduled for  to monitor these conditions.  - Continue routine follow-up and monitoring.    6. Hypermobility Spectrum Disorder.  - Reports symptoms consistent

## 2025-08-11 ENCOUNTER — HOSPITAL ENCOUNTER (OUTPATIENT)
Age: 30
Discharge: HOME | End: 2025-08-11
Payer: COMMERCIAL

## 2025-08-11 LAB
25(OH)D3 SERPL-MCNC: 29.8 NG/ML (ref 30–100)
T4 FREE SERPL-MCNC: 1 NG/DL (ref 0.92–1.68)
TSH SERPL DL<=0.05 MIU/L-ACNC: 5.04 UIU/ML (ref 0.27–4.2)

## 2025-08-11 PROCEDURE — 84439 ASSAY OF FREE THYROXINE: CPT

## 2025-08-11 PROCEDURE — 84443 ASSAY THYROID STIM HORMONE: CPT

## 2025-08-11 PROCEDURE — 82306 VITAMIN D 25 HYDROXY: CPT

## 2025-08-11 PROCEDURE — 36415 COLL VENOUS BLD VENIPUNCTURE: CPT
